# Patient Record
Sex: FEMALE | Race: WHITE | Employment: FULL TIME | URBAN - METROPOLITAN AREA
[De-identification: names, ages, dates, MRNs, and addresses within clinical notes are randomized per-mention and may not be internally consistent; named-entity substitution may affect disease eponyms.]

---

## 2017-10-03 PROBLEM — Z87.442 HISTORY OF RENAL STONE: Status: ACTIVE | Noted: 2017-10-03

## 2022-03-19 PROBLEM — Z87.442 HISTORY OF RENAL STONE: Status: ACTIVE | Noted: 2017-10-03

## 2022-11-03 ENCOUNTER — OFFICE VISIT (OUTPATIENT)
Dept: ORTHOPEDIC SURGERY | Age: 52
End: 2022-11-03
Payer: COMMERCIAL

## 2022-11-03 VITALS — BODY MASS INDEX: 21.83 KG/M2 | HEIGHT: 65 IN | WEIGHT: 131 LBS

## 2022-11-03 DIAGNOSIS — M61.411: ICD-10-CM

## 2022-11-03 DIAGNOSIS — S43.431A SUPERIOR GLENOID LABRUM LESION OF RIGHT SHOULDER, INITIAL ENCOUNTER: ICD-10-CM

## 2022-11-03 DIAGNOSIS — S43.431A LABRAL TEAR OF SHOULDER, RIGHT, INITIAL ENCOUNTER: ICD-10-CM

## 2022-11-03 DIAGNOSIS — G89.29 CHRONIC RIGHT SHOULDER PAIN: Primary | ICD-10-CM

## 2022-11-03 DIAGNOSIS — M25.511 CHRONIC RIGHT SHOULDER PAIN: Primary | ICD-10-CM

## 2022-11-03 PROCEDURE — 99213 OFFICE O/P EST LOW 20 MIN: CPT | Performed by: ORTHOPAEDIC SURGERY

## 2022-11-03 NOTE — PROGRESS NOTES
Corie Sotelo (: 1970) is a 45 y.o. female, patient, here for evaluation of the following chief complaint(s):  Shoulder Pain (Ongoing right shoulder pain )       HPI:    She began having increased right shoulder pain approximately 2 months ago. The patient reports no specific injury but does think that it could potentially be Cross[employer] related. She states that she started having some intermittent pain initially and her pain has progressed and she now describes her right shoulder pain is moderate, dull, aching, burning, and constant. Her right shoulder pain does make it difficult for her to go to sleep and does frequently wake her up from sleep. The patient states that her right shoulder pain continues to get worse. She reports that exercise and twisting makes her pain worse and being aware of and avoiding certain movements and activities makes her pain better. The patient has not been taking any medication for her discomfort. She was not seen in the emergency room for right shoulder pain. The patient reports no previous or related right shoulder surgery. No Known Allergies    Current Outpatient Medications   Medication Sig    fexofenadine (ALLEGRA) 180 mg tablet Take 1 Tab by mouth daily. fexofenadine-pseudoephedrine (ALLEGRA-D 24 HOUR) 180-240 mg per tablet Take 1 Tab by mouth daily. scopolamine (TRANSDERM-SCOP) 1 mg over 3 days pt3d 1 Patch by TransDERmal route every seventy-two (72) hours. glucosamine-chondroitin (ARTHX) 500-400 mg cap Take 1 Cap by mouth daily. cholecalciferol (VITAMIN D3) 1,000 unit cap Take  by mouth daily. triamcinolone (NASACORT) 55 mcg nasal inhaler 2 Sprays daily. norgestimate-ethinyl estradiol (ORTHO-CYCLEN) 0.25-35 mg-mcg tab Take 1 Tab by mouth daily. No current facility-administered medications for this visit.        Past Medical History:   Diagnosis Date    Calculus of kidney 2008    Date is an estimate    Fractures, stress     1 in leg, 1 in hip while running; no treatment for osteopenia    Rubio's neuroma         Past Surgical History:   Procedure Laterality Date    HX ORTHOPAEDIC      [place] BREAST SURGERY PROCEDURE UNLISTED  Late ? Breast lump biopsy (just a cyst)       Family History   Problem Relation Age of Onset    Hypertension Mother     Diabetes Father     Hypertension Father     Alcohol abuse Father     Stroke Father     Breast Cancer Maternal Aunt     Cancer Maternal Grandfather         prostate/bone? Heart Disease Maternal Grandfather     Cancer Paternal Grandfather         colon or pancreatic -  in 5 from the cancer    Cancer Maternal Aunt         breast    Heart Disease Paternal Grandmother         Social History     Socioeconomic History    Marital status:      Spouse name: Not on file    Number of children: Not on file    Years of education: Not on file    Highest education level: Not on file   Occupational History    Not on file   Tobacco Use    Smoking status: Former     Years: 10.00     Types: Cigarettes     Quit date: 1998     Years since quittin.8    Smokeless tobacco: Never   Substance and Sexual Activity    Alcohol use: Yes     Comment: I have a glass of wine every two or three months. Drug use: No    Sexual activity: Never   Other Topics Concern    Not on file   Social History Narrative    Not on file     Social Determinants of Health     Financial Resource Strain: Not on file   Food Insecurity: Not on file   Transportation Needs: Not on file   Physical Activity: Not on file   Stress: Not on file   Social Connections: Not on file   Intimate Partner Violence: Not on file   Housing Stability: Not on file       Review of Systems   All other systems reviewed and are negative. Vitals:  Ht 5' 5\" (1.651 m)   Wt 131 lb (59.4 kg)   BMI 21.80 kg/m²    Body mass index is 21.8 kg/m². Ortho Exam     Insert the patient is well-developed and well-nourished.   The patient presents today in alert and oriented x3 with a normal mood and affect. The patient stands with a normal weightbearing line and walks with a normal gait. Right shoulder, the patient sits with normal posture. They are mildly tender to palpation over the proximal biceps and posterior joint line. The patient has full range of motion, but discomfort with above shoulder range of motion. The patient has discomfort with Patino´s test and SLAP testing. There is provocative testing for a SLAP tear. The shoulder is stable on exam. They have 5/5 strength, and are neurovascularly intact distally. There is no erythema, warmth or skin lesions present. ASSESSMENT/PLAN:      1. Chronic right shoulder pain  -     XR SHOULDER RT AP/LAT MIN 2 V; Future  2. Labral tear of shoulder, right, initial encounter  -     MRI SHOULDER RT WO CONT; Future  3. Superior glenoid labrum lesion of right shoulder, initial encounter  -     MRI SHOULDER RT WO CONT; Future  4. Other calcification of muscle, right shoulder  -     MRI SHOULDER RT WO CONT; Future     XR Results (most recent):  Results from Appointment encounter on 11/03/22    XR SHOULDER RT AP/LAT MIN 2 V    Narrative  Of the right shoulder shows no evidence of fracture or dislocation. There is no evidence of degenerative disease but there is an area of calcification which is at approximately the level of the labrum/superior glenoid the does not appear to be calcific tendinitis because of its location being medial.  Unsure of the etiology of the calcification. Below is the assessment and plan developed based on review of pertinent history, physical exam, labs, studies, and medications. We discussed the patient's ongoing and worsening right shoulder pain and her signs, symptoms, physical exam, description of her pain, and x-rays are consistent with a labral tear/SLAP lesion. There is evidence on x-ray of an area of calcification which is at the level of the labrum/superior glenoid.   The calcification does not appear to be calcific tendinitis because of its location medially. The possible treatment options were discussed with the patient and because of the over 2-month long duration of her increased and worsening right shoulder pain, no improvement with multiple modalities of conservative management including an at-home exercise program, her physical exam, description of her pain, x-rays, and her inability to complete daily living activities without significant discomfort we elected to obtain an MRI of her right shoulder to further evaluate the severity of her labral tear/SLAP lesion and determine the location of the area of calcification. The MRI images and results will be used in preoperative planning if and almost certainly when surgical intervention is necessary. The risks and benefits of the MRI were discussed in detail with the patient and she would like to proceed. We will schedule at her convenience. I will see her back after MRI is complete to discuss the images, results, and further treatment options. In the interim, I did encourage her to ice when possible, modify her activity level based on her right shoulder pain, and use anti-inflammatory medication when necessary. The patient will also work on range of motion, strengthening, and stretching exercises with an at-home exercise program as pain tolerates. I will see her back as noted above after right shoulder MRI is complete. **We will obtain an MRI for more information to determine the best treatment plan moving forward and help us prepare for surgical intervention if necessary. **    Return for After her right shoulder MRI is complete. An electronic signature was used to authenticate this note.   -- Mikaela Yanez MD

## 2022-11-11 ENCOUNTER — OFFICE VISIT (OUTPATIENT)
Dept: ORTHOPEDIC SURGERY | Age: 52
End: 2022-11-11
Payer: COMMERCIAL

## 2022-11-11 VITALS — WEIGHT: 131 LBS | BODY MASS INDEX: 21.05 KG/M2 | HEIGHT: 66 IN

## 2022-11-11 DIAGNOSIS — M61.411: ICD-10-CM

## 2022-11-11 DIAGNOSIS — M89.511 OSTEOLYSIS OF ACROMIAL END OF RIGHT CLAVICLE: ICD-10-CM

## 2022-11-11 DIAGNOSIS — M25.511 CHRONIC RIGHT SHOULDER PAIN: Primary | ICD-10-CM

## 2022-11-11 DIAGNOSIS — G89.29 CHRONIC RIGHT SHOULDER PAIN: Primary | ICD-10-CM

## 2022-11-11 PROCEDURE — 99214 OFFICE O/P EST MOD 30 MIN: CPT | Performed by: ORTHOPAEDIC SURGERY

## 2022-11-11 NOTE — PROGRESS NOTES
Corie Sotelo (: 1970) is a 45 y.o. female, patient, here for evaluation of the following chief complaint(s):  Shoulder Pain (Right, mri results)       HPI:    She was last seen for right shoulder pain on 11/3/2022. Since then, the patient did have an MRI performed on her right shoulder on 2022. The patient states that her pain levels the same if not worse than it was at her last visit. The patient rates the severity of right shoulder pain is a 6 out of 10. She describes pain as aching and constant. Her right shoulder pain does make it difficult for her to go to sleep and does frequently wake her up from sleep. Patient reports taking no medication for discomfort. Right shoulder MRI images and results were independently reviewed and they were consistent with prominent reactive marrow changes at the [facility] joint either stress related or due to early degenerative changes. Small focal area of low signal in the capsule joint posterior superior to the biceps anchor. Given findings on radiograph this is favored to represent calcium hydroxyapatite deposition. Minimal edema with thin the lateral fibers of the deltoid compatible with mild strain. No Known Allergies    Current Outpatient Medications   Medication Sig    fexofenadine (ALLEGRA) 180 mg tablet Take 1 Tab by mouth daily. fexofenadine-pseudoephedrine (ALLEGRA-D 24 HOUR) 180-240 mg per tablet Take 1 Tab by mouth daily. scopolamine (TRANSDERM-SCOP) 1 mg over 3 days pt3d 1 Patch by TransDERmal route every seventy-two (72) hours. glucosamine-chondroitin (ARTHX) 500-400 mg cap Take 1 Cap by mouth daily. cholecalciferol (VITAMIN D3) 1,000 unit cap Take  by mouth daily. triamcinolone (NASACORT) 55 mcg nasal inhaler 2 Sprays daily. norgestimate-ethinyl estradiol (ORTHO-CYCLEN) 0.25-35 mg-mcg tab Take 1 Tab by mouth daily. No current facility-administered medications for this visit.        Past Medical History:   Diagnosis Date    Calculus of kidney 2008    Date is an estimate    Fractures, stress     1 in leg, 1 in hip while running; no treatment for osteopenia    Rubio's neuroma         Past Surgical History:   Procedure Laterality Date    HX ORTHOPAEDIC      [place] BREAST SURGERY PROCEDURE UNLISTED  Late ? Breast lump biopsy (just a cyst)       Family History   Problem Relation Age of Onset    Hypertension Mother     Diabetes Father     Hypertension Father     Alcohol abuse Father     Stroke Father     Breast Cancer Maternal Aunt     Cancer Maternal Grandfather         prostate/bone? Heart Disease Maternal Grandfather     Cancer Paternal Grandfather         colon or pancreatic -  in 5 from the cancer    Cancer Maternal Aunt         breast    Heart Disease Paternal Grandmother         Social History     Socioeconomic History    Marital status:      Spouse name: Not on file    Number of children: Not on file    Years of education: Not on file    Highest education level: Not on file   Occupational History    Not on file   Tobacco Use    Smoking status: Former     Years: 10.00     Types: Cigarettes     Quit date: 1998     Years since quittin.8    Smokeless tobacco: Never   Substance and Sexual Activity    Alcohol use: Yes     Comment: I have a glass of wine every two or three months. Drug use: No    Sexual activity: Never   Other Topics Concern    Not on file   Social History Narrative    Not on file     Social Determinants of Health     Financial Resource Strain: Not on file   Food Insecurity: Not on file   Transportation Needs: Not on file   Physical Activity: Not on file   Stress: Not on file   Social Connections: Not on file   Intimate Partner Violence: Not on file   Housing Stability: Not on file       Review of Systems   All other systems reviewed and are negative. Vitals:  Ht 5' 6\" (1.676 m)   Wt 131 lb (59.4 kg)   BMI 21.14 kg/m²    Body mass index is 21.14 kg/m².     Ortho Exam The patient is well-developed and well-nourished. The patient presents today in alert and oriented x3 with a normal mood and affect. The patient stands with a normal weightbearing line and walks with a normal gait. Right shoulder, the patient sits with normal posture. They are mildly tender to palpation over the proximal biceps and posterior joint line also has significant tenderness over her AC joint. With provocative testing of her [facility], [place], operated by [facility] joint she has pain with both compression and distraction maneuvers. The patient has full range of motion, but discomfort with above shoulder range of motion. The patient has discomfort with Patino´s test and SLAP testing. There is provocative testing for a SLAP tear. The shoulder is stable on exam. They have 5/5 strength, and are neurovascularly intact distally. There is no erythema, warmth or skin lesions present. ASSESSMENT/PLAN:      1. Chronic right shoulder pain  2. Other calcification of muscle, right shoulder  3. 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 We discussed the patient's ongoing and worsening right shoulder pain and we independently reviewed her MRI images and results and they were consistent with prominent reactive marrow changes at the [facility] [place], operated by [facility] joint either stress related or due to early degenerative changes. Small focal area of low signal in the capsule joint posterior superior to the biceps anchor. Given findings on radiograph this is favored to represent calcium hydroxyapatite deposition. Minimal edema with thin the lateral fibers of the deltoid compatible with mild strain. Her physical exam is consistent with osteolysis of the distal clavicle.   The possible treatment options were discussed with the patient and because of the duration of her increased and worsening pain, no improvement with multiple modalities of conservative management including an at-home exercise program, her physical exam, description of her pain, past x-rays, independently reviewed MRI images and results, and her inability to complete daily living activities without significant discomfort we both decided that surgical intervention was the best treatment plan. The risks and benefits of a right shoulder arthroscopic exam with extensive debridement and distal clavicle resection were discussed in detail with the patient and she would like to proceed. We will schedule this at her convenience. In the interim, I did encourage her to ice when possible, modify her activity level based on her right shoulder pain, and use anti-inflammatory medication when necessary. The patient will also work on range of motion, strengthening, and stretching exercises with an at-home exercise program as pain tolerates. I will see her back in the office on an as-needed basis or on the day of her upcoming right shoulder surgery. Return for In the office on an as needed basis or on the day of her upcoming right shoulder surgery. An electronic signature was used to authenticate this note.   -- Shayne Morgan MD

## 2022-11-21 DIAGNOSIS — M61.411: ICD-10-CM

## 2022-11-21 DIAGNOSIS — M89.511 OSTEOLYSIS OF ACROMIAL END OF RIGHT CLAVICLE: Primary | ICD-10-CM

## 2022-11-22 RX ORDER — OXYCODONE AND ACETAMINOPHEN 5; 325 MG/1; MG/1
1 TABLET ORAL
Qty: 40 TABLET | Refills: 0 | Status: SHIPPED | OUTPATIENT
Start: 2022-11-22 | End: 2022-11-29

## 2022-11-29 ENCOUNTER — OFFICE VISIT (OUTPATIENT)
Dept: ORTHOPEDIC SURGERY | Age: 52
End: 2022-11-29
Payer: COMMERCIAL

## 2022-11-29 DIAGNOSIS — M25.511 PAIN OF RIGHT SHOULDER REGION: Primary | ICD-10-CM

## 2022-11-29 DIAGNOSIS — Z98.890 STATUS POST SHOULDER SURGERY: ICD-10-CM

## 2022-11-29 PROCEDURE — 99024 POSTOP FOLLOW-UP VISIT: CPT | Performed by: ORTHOPAEDIC SURGERY

## 2022-11-29 NOTE — PROGRESS NOTES
Corie Sotelo (: 1970) is a 45 y.o. female, patient, here for evaluation of the following chief complaint(s):  Shoulder Pain (right) and Surgical Follow-up (Sx 22)       HPI:    She is now approaching 1 week status post right shoulder arthroscopic exam with distal clavicle resection and extensive debridement. Her surgery was performed on 2022. The patient gives no detail or description of her postoperative discomfort. She has been wearing a sling for comfort, stability, and support. She has been taking medication for pain as needed. No Known Allergies    Current Outpatient Medications   Medication Sig    oxyCODONE-acetaminophen (Percocet) 5-325 mg per tablet Take 1 Tablet by mouth every four to six (4-6) hours as needed for Pain for up to 7 days. Max Daily Amount: 6 Tablets. glucosamine-chondroitin (ARTHX) 500-400 mg cap Take 1 Cap by mouth daily. cholecalciferol (VITAMIN D3) 1,000 unit cap Take  by mouth daily. triamcinolone (NASACORT) 55 mcg nasal inhaler 2 Sprays daily. norgestimate-ethinyl estradiol (ORTHO-CYCLEN) 0.25-35 mg-mcg tab Take 1 Tab by mouth daily. No current facility-administered medications for this visit. Past Medical History:   Diagnosis Date    Calculus of kidney 2008    Date is an estimate    Fractures, stress     1 in leg, 1 in hip while running; no treatment for osteopenia    Rubio's neuroma         Past Surgical History:   Procedure Laterality Date    HX ORTHOPAEDIC      [place] BREAST SURGERY PROCEDURE UNLISTED  Late ? Breast lump biopsy (just a cyst)       Family History   Problem Relation Age of Onset    Hypertension Mother     Diabetes Father     Hypertension Father     Alcohol abuse Father     Stroke Father     Breast Cancer Maternal Aunt     Cancer Maternal Grandfather         prostate/bone?     Heart Disease Maternal Grandfather     Cancer Paternal Grandfather         colon or pancreatic -  in 5 from the cancer Cancer Maternal Aunt         breast    Heart Disease Paternal Grandmother         Social History     Socioeconomic History    Marital status:      Spouse name: Not on file    Number of children: Not on file    Years of education: Not on file    Highest education level: Not on file   Occupational History    Not on file   Tobacco Use    Smoking status: Former     Years: 10.00     Types: Cigarettes     Quit date: 1998     Years since quittin.9    Smokeless tobacco: Never   Substance and Sexual Activity    Alcohol use: Yes     Comment: I have a glass of wine every two or three months. Drug use: No    Sexual activity: Never   Other Topics Concern    Not on file   Social History Narrative    Not on file     Social Determinants of Health     Financial Resource Strain: Not on file   Food Insecurity: Not on file   Transportation Needs: Not on file   Physical Activity: Not on file   Stress: Not on file   Social Connections: Not on file   Intimate Partner Violence: Not on file   Housing Stability: Not on file       Review of Systems   All other systems reviewed and are negative. Vitals: There were no vitals taken for this visit. There is no height or weight on file to calculate BMI. Ortho Exam     The patient is well-developed and well-nourished. The patient presents today in alert and oriented x3 with a normal mood and affect. The patient stands with a normal weightbearing line and walks with a normal gait. Right shoulder wounds clean and dry neurovascular intact. There is some ecchymosis but no erythema. Her stitches were removed and her incisions are healing nicely with no sign of irritation or infection and look normal.  She does have well-maintained elbow and wrist range of motion. Her shoulder range of motion was not tested today. Her shoulder strength was not tested today. Her sensations are intact and pulses are 2+. Her skin is healing nicely. ASSESSMENT/PLAN:      1.  Pain of right shoulder region  2. Status post shoulder surgery     Below is the assessment and plan developed based on review of pertinent history, physical exam, labs, studies, and medications. We discussed the patient's recent right shoulder arthroscopic exam with distal clavicle resection and extensive debridement. Her surgery was performed on 11/23/2022. She is now approaching 1 week status postsurgical intervention. The patient is progressing nicely in the early stages of her recovery and having the appropriate amount of expected postoperative discomfort at this time. We did remove the patient stitches in the office without complication. Her incisions are healing nicely with no sign of irritation or infection and look normal.  She does have good early elbow and wrist range of motion. Her shoulder range of motion and strength were not tested today. The patient will continue the postoperative protocol by wearing a sling for assistance. We did discuss with the patient that she may discontinue use of the sling moving forward at any time. She will work on range of motion, strengthening, and stretching exercises with an at-home exercise program as pain tolerates. The patient states that her daughter is a physical therapist and she is going to help with her postoperative recovery. I did encourage her to ice when possible, modify her activity level based on her postoperative right shoulder pain, and use anti-inflammatory medication when necessary. I will see her back in 3 weeks for reevaluation and further discussion of the postoperative protocol. Return in about 3 weeks (around 12/20/2022) for Reevaluation and further discussion of the postop protocol. An electronic signature was used to authenticate this note.   -- Kwasi Denis MD

## 2022-12-27 ENCOUNTER — OFFICE VISIT (OUTPATIENT)
Dept: ORTHOPEDIC SURGERY | Age: 52
End: 2022-12-27
Payer: COMMERCIAL

## 2022-12-27 VITALS — WEIGHT: 130 LBS | BODY MASS INDEX: 20.89 KG/M2 | HEIGHT: 66 IN

## 2022-12-27 DIAGNOSIS — M25.511 PAIN OF RIGHT SHOULDER REGION: Primary | ICD-10-CM

## 2022-12-27 DIAGNOSIS — Z98.890 STATUS POST SHOULDER SURGERY: ICD-10-CM

## 2022-12-27 PROCEDURE — 99024 POSTOP FOLLOW-UP VISIT: CPT | Performed by: ORTHOPAEDIC SURGERY

## 2022-12-27 NOTE — PROGRESS NOTES
Corie Sotelo (: 1970) is a 45 y.o. female, patient, here for evaluation of the following chief complaint(s):  Shoulder Pain (right) and Surgical Follow-up (Sx 22)       HPI:    She is now approximately 5 weeks status post right shoulder arthroscopic exam with distal clavicle resection and extensive debridement. Her surgery was performed on 2022. She was last seen on 2022. The patient states that her pain level is improved since her last visit and surgical procedure. She rates the severity of her postoperative right shoulder pain is a 2 out of 10. She describes her pain as aching and intermittent. Her postoperative right shoulder pain does not wake her up from sleep at night. She has been taking Motrin for her discomfort as needed. The patient has been attending formal physical therapy postoperatively. She is also been working on these exercises with an at-home exercise program.  She reports that the medication, formal PT, and home exercises have all helped reduce her postoperative right shoulder pain. No Known Allergies    Current Outpatient Medications   Medication Sig    glucosamine-chondroitin (ARTHX) 500-400 mg cap Take 1 Cap by mouth daily. cholecalciferol (VITAMIN D3) 1,000 unit cap Take  by mouth daily. triamcinolone (NASACORT) 55 mcg nasal inhaler 2 Sprays daily. norgestimate-ethinyl estradiol (ORTHO-CYCLEN) 0.25-35 mg-mcg tab Take 1 Tab by mouth daily. No current facility-administered medications for this visit. Past Medical History:   Diagnosis Date    Calculus of kidney 2008    Date is an estimate    Fractures, stress     1 in leg, 1 in hip while running; no treatment for osteopenia    Rubio's neuroma         Past Surgical History:   Procedure Laterality Date    HX ORTHOPAEDIC      [place] BREAST SURGERY PROCEDURE UNLISTED  Late ?     Breast lump biopsy (just a cyst)       Family History   Problem Relation Age of Onset    Hypertension Mother     Diabetes Father     Hypertension Father     Alcohol abuse Father     Stroke Father     Breast Cancer Maternal Aunt     Cancer Maternal Grandfather         prostate/bone? Heart Disease Maternal Grandfather     Cancer Paternal Grandfather         colon or pancreatic -  in 5 from the cancer    Cancer Maternal Aunt         breast    Heart Disease Paternal Grandmother         Social History     Socioeconomic History    Marital status:      Spouse name: Not on file    Number of children: Not on file    Years of education: Not on file    Highest education level: Not on file   Occupational History    Not on file   Tobacco Use    Smoking status: Former     Years: 10.00     Types: Cigarettes     Quit date: 1998     Years since quittin.0    Smokeless tobacco: Never   Substance and Sexual Activity    Alcohol use: Yes     Comment: I have a glass of wine every two or three months. Drug use: No    Sexual activity: Never   Other Topics Concern    Not on file   Social History Narrative    Not on file     Social Determinants of Health     Financial Resource Strain: Not on file   Food Insecurity: Not on file   Transportation Needs: Not on file   Physical Activity: Not on file   Stress: Not on file   Social Connections: Not on file   Intimate Partner Violence: Not on file   Housing Stability: Not on file       Review of Systems   All other systems reviewed and are negative. Vitals:  Ht 5' 6\" (1.676 m)   Wt 130 lb (59 kg)   BMI 20.98 kg/m²    Body mass index is 20.98 kg/m². Ortho Exam     The patient is well-developed and well-nourished. The patient presents today in alert and oriented x3 with a normal mood and affect. The patient stands with a normal weightbearing line and walks with a normal gait. Right shoulder wounds are clean and dry neurovascular intact. There is no ecchymosis or erythema.   Her incisions are well-healed with no sign of irritation or infection and look normal. She does have full elbow and wrist range of motion. She has regained full shoulder range of motion. Her strength continues to improve. She still does have some tenderness over the site of a previous distal clavicle resection. Her sensations are intact her pulses are 2+. Her skin is well healed. ASSESSMENT/PLAN:      1. Pain of right shoulder region  2. Status post shoulder surgery  -     REFERRAL TO PHYSICAL THERAPY     Below is the assessment and plan developed based on review of pertinent history, physical exam, labs, studies, and medications. **The patient will continue attending formal physical therapy as needed. **    We discussed the patient's right shoulder arthroscopic exam with distal clavicle resection and extensive debridement. Her surgery was performed on 11/23/2022. She is now approximately 5 weeks status postsurgical intervention. The patient continues to progress very nicely in her recovery. She reports little to no postoperative discomfort. She has regained full shoulder range of motion. Her strength continues to improve. The patient will continue the postoperative protocol by working on range of motion, strengthening, and stretching exercises at both formal physical therapy as needed and with an at-home exercise program as pain tolerates. She may continue to increase activities as tolerated and as discussed today in the office. I did encourage her to ice when possible, modify her activity level if she experiences any postoperative right shoulder pain, and use anti-inflammatory medication when necessary. I will see her back in 4 weeks for reevaluation if she has any continued persistence of her pain however, if her pain has improved and is well-maintained I will see her back on an as-needed basis. Return in about 4 weeks (around 1/24/2023) for Re-evaluation and further discussion of the postop protocol. An electronic signature was used to authenticate this note.   -- Deni Mcclendon MD

## 2023-02-24 ENCOUNTER — OFFICE VISIT (OUTPATIENT)
Dept: PRIMARY CARE CLINIC | Age: 53
End: 2023-02-24
Payer: COMMERCIAL

## 2023-02-24 VITALS
SYSTOLIC BLOOD PRESSURE: 117 MMHG | OXYGEN SATURATION: 100 % | BODY MASS INDEX: 21.38 KG/M2 | HEIGHT: 66 IN | HEART RATE: 68 BPM | RESPIRATION RATE: 16 BRPM | TEMPERATURE: 97.5 F | DIASTOLIC BLOOD PRESSURE: 75 MMHG | WEIGHT: 133 LBS

## 2023-02-24 DIAGNOSIS — Z11.59 NEED FOR HEPATITIS C SCREENING TEST: ICD-10-CM

## 2023-02-24 DIAGNOSIS — Z98.890 S/P ARTHROSCOPY OF RIGHT SHOULDER: ICD-10-CM

## 2023-02-24 DIAGNOSIS — Z00.00 PHYSICAL EXAM: Primary | ICD-10-CM

## 2023-02-24 DIAGNOSIS — M85.89 OSTEOPENIA OF MULTIPLE SITES: ICD-10-CM

## 2023-02-24 DIAGNOSIS — E55.9 VITAMIN D DEFICIENCY: ICD-10-CM

## 2023-02-24 NOTE — PROGRESS NOTES
Corie Sotelo (: 1970) is a 45 y.o. female, established patient, here for evaluation of the following chief complaint(s):  No chief complaint on file. ASSESSMENT/PLAN:  Below is the assessment and plan developed based on review of pertinent history, physical exam, labs, studies, and medications. {There are no diagnoses linked to this encounter. (Refresh or delete this SmartLink)}    No follow-ups on file. SUBJECTIVE/OBJECTIVE:  HPI  Patient presents today for an office visit and a physical.            Patient Active Problem List   Diagnosis Code    History of renal stone Z87.442        Current Outpatient Medications on File Prior to Visit   Medication Sig Dispense Refill    glucosamine-chondroitin (ARTHX) 500-400 mg cap Take 1 Cap by mouth daily. cholecalciferol (VITAMIN D3) 1,000 unit cap Take  by mouth daily. triamcinolone (NASACORT) 55 mcg nasal inhaler 2 Sprays daily. norgestimate-ethinyl estradiol (ORTHO-CYCLEN) 0.25-35 mg-mcg tab Take 1 Tab by mouth daily. No current facility-administered medications on file prior to visit. No Known Allergies    Past Medical History:   Diagnosis Date    Calculus of kidney 2008    Date is an estimate    Fractures, stress     1 in leg, 1 in hip while running; no treatment for osteopenia    Rubio's neuroma        Past Surgical History:   Procedure Laterality Date    HX ORTHOPAEDIC      [place] BREAST SURGERY PROCEDURE UNLISTED  Late ? Breast lump biopsy (just a cyst)       Family History   Problem Relation Age of Onset    Hypertension Mother     Diabetes Father     Hypertension Father     Alcohol abuse Father     Stroke Father     Breast Cancer Maternal Aunt     Cancer Maternal Grandfather         prostate/bone?     Heart Disease Maternal Grandfather     Cancer Paternal Grandfather         colon or pancreatic -  in 1943 from the cancer    Cancer Maternal Aunt         breast    Heart Disease Paternal Grandmother Social History     Socioeconomic History    Marital status:      Spouse name: Not on file    Number of children: Not on file    Years of education: Not on file    Highest education level: Not on file   Occupational History    Not on file   Tobacco Use    Smoking status: Former     Years: 10.00     Types: Cigarettes     Quit date: 1998     Years since quittin.1    Smokeless tobacco: Never   Substance and Sexual Activity    Alcohol use: Yes     Comment: I have a glass of wine every two or three months. Drug use: No    Sexual activity: Never   Other Topics Concern    Not on file   Social History Narrative    Not on file     Social Determinants of Health     Financial Resource Strain: Not on file   Food Insecurity: Not on file   Transportation Needs: Not on file   Physical Activity: Not on file   Stress: Not on file   Social Connections: Not on file   Intimate Partner Violence: Not on file   Housing Stability: Not on file       No visits with results within 3 Month(s) from this visit.    Latest known visit with results is:   Office Visit on 2018   Component Date Value Ref Range Status    WBC 2018 5.8  3.4 - 10.8 x10E3/uL Final    RBC 2018 3.83  3.77 - 5.28 x10E6/uL Final    HGB 2018 12.3  11.1 - 15.9 g/dL Final    HCT 2018 37.4  34.0 - 46.6 % Final    MCV 2018 98 (A)  79 - 97 fL Final    MCH 2018 32.1  26.6 - 33.0 pg Final    MCHC 2018 32.9  31.5 - 35.7 g/dL Final    RDW 2018 12.9  12.3 - 15.4 % Final    PLATELET  045  150 - 379 x10E3/uL Final    Glucose 2018 81  65 - 99 mg/dL Final    BUN 2018 14  6 - 24 mg/dL Final    Creatinine 2018 0.85  0.57 - 1.00 mg/dL Final    GFR est non-AA 2018 80  >59 mL/min/1.73 Final    GFR est AA 2018 92  >59 mL/min/1.73 Final    BUN/Creatinine ratio 2018 16  9 - 23 Final    Sodium 2018 140  134 - 144 mmol/L Final    Potassium 2018 4.4  3.5 - 5.2 mmol/L Final    Chloride 12/14/2018 105  96 - 106 mmol/L Final    CO2 12/14/2018 25  20 - 29 mmol/L Final    Calcium 12/14/2018 9.1  8.7 - 10.2 mg/dL Final    Protein, total 12/14/2018 6.2  6.0 - 8.5 g/dL Final    Albumin 12/14/2018 4.3  3.5 - 5.5 g/dL Final    GLOBULIN, TOTAL 12/14/2018 1.9  1.5 - 4.5 g/dL Final    A-G Ratio 12/14/2018 2.3 (A)  1.2 - 2.2 Final    Bilirubin, total 12/14/2018 0.4  0.0 - 1.2 mg/dL Final    Alk. phosphatase 12/14/2018 49  39 - 117 IU/L Final    AST (SGOT) 12/14/2018 11  0 - 40 IU/L Final    ALT (SGPT) 12/14/2018 11  0 - 32 IU/L Final       Review of Systems   Constitutional:  Negative for activity change, fatigue and unexpected weight change. HENT:  Negative for congestion, hearing loss, rhinorrhea and sore throat. Eyes:  Negative for discharge. Respiratory:  Negative for cough, chest tightness and shortness of breath. Cardiovascular:  Negative for leg swelling. Gastrointestinal:  Negative for abdominal pain, constipation and diarrhea. Genitourinary:  Negative for dysuria, flank pain, frequency and urgency. Musculoskeletal:  Negative for arthralgias, back pain and myalgias. Skin:  Negative for color change and rash. Neurological:  Negative for dizziness, light-headedness and headaches. Psychiatric/Behavioral:  Negative for dysphoric mood and sleep disturbance. The patient is not nervous/anxious. There were no vitals taken for this visit. Physical Exam  Vitals and nursing note reviewed. Constitutional:       General: She is not in acute distress. Appearance: Normal appearance. She is normal weight. She is not diaphoretic. HENT:      Right Ear: Tympanic membrane, ear canal and external ear normal.      Left Ear: Tympanic membrane, ear canal and external ear normal.      Mouth/Throat:      Mouth: Mucous membranes are moist.      Pharynx: Oropharynx is clear. Eyes:      General:         Right eye: No discharge. Left eye: No discharge.       Extraocular Movements: Extraocular movements intact. Conjunctiva/sclera: Conjunctivae normal.   Neck:      Thyroid: No thyromegaly. Cardiovascular:      Rate and Rhythm: Normal rate and regular rhythm. Pulses: Normal pulses. Dorsalis pedis pulses are 2+ on the right side and 2+ on the left side. Pulmonary:      Effort: Pulmonary effort is normal.      Breath sounds: Normal breath sounds. No wheezing. Abdominal:      General: Bowel sounds are normal. There is no distension. Palpations: Abdomen is soft. Tenderness: There is no abdominal tenderness. Musculoskeletal:      Right lower leg: No edema. Left lower leg: No edema. Comments: B/L knees without crepitus   Lymphadenopathy:      Cervical: No cervical adenopathy. Neurological:      Deep Tendon Reflexes:      Reflex Scores:       Patellar reflexes are 2+ on the right side and 2+ on the left side. Psychiatric:         Mood and Affect: Mood and affect normal.         I, Juice Restrepo MD, personally performed the services described in this documentation as scribed by Luis Moreland in my presence, and it is both accurate and complete. An electronic signature was used to authenticate this note.   -- Luis Moreland

## 2023-02-24 NOTE — PROGRESS NOTES
Subjective:   45 y.o. female for Well Woman Check. Her gyne and breast care is done elsewhere by her Ob-Gyne physician. Shoulder procedure done 3 months ago. Doing well. Patient Active Problem List    Diagnosis Date Noted    S/P arthroscopy of right shoulder 2023    Osteopenia of multiple sites 2023    History of renal stone 10/03/2017     Current Outpatient Medications   Medication Sig Dispense Refill    glucosamine-chondroitin (ARTHX) 500-400 mg cap Take 1 Cap by mouth daily. cholecalciferol (VITAMIN D3) 25 mcg (1,000 unit) cap Take  by mouth daily. triamcinolone (NASACORT AQ) 55 mcg nasal inhaler 2 Sprays daily. norgestimate-ethinyl estradiol (ORTHO-CYCLEN) 0.25-35 mg-mcg tab Take 1 Tab by mouth daily. No Known Allergies  Past Medical History:   Diagnosis Date    Calculus of kidney 2008    Date is an estimate    Fractures, stress     1 in leg, 1 in hip while running; no treatment for osteopenia    Rubio's neuroma      Past Surgical History:   Procedure Laterality Date    HX ORTHOPAEDIC      [place] UNLISTED PROCEDURE BREAST  Late ? Breast lump biopsy (just a cyst)     Family History   Problem Relation Age of Onset    Hypertension Mother     Diabetes Father     Hypertension Father     Alcohol abuse Father     Stroke Father     Breast Cancer Maternal Aunt     Cancer Maternal Grandfather         prostate/bone? Heart Disease Maternal Grandfather     Cancer Paternal Grandfather         colon or pancreatic -  in 1943 from the cancer    Cancer Maternal Aunt         breast    Heart Disease Paternal Grandmother      Social History     Tobacco Use    Smoking status: Former     Years: 10.00     Types: Cigarettes     Quit date: 1998     Years since quittin.1    Smokeless tobacco: Never   Substance Use Topics    Alcohol use: Yes     Comment: I have a glass of wine every two or three months.         Lab Results   Component Value Date/Time    WBC 5.8 2018 11:23 AM    HGB 12.3 12/14/2018 11:23 AM    HCT 37.4 12/14/2018 11:23 AM    PLATELET 367 77/30/4439 11:23 AM    MCV 98 (H) 12/14/2018 11:23 AM     Lab Results   Component Value Date/Time    Glucose 81 12/14/2018 11:23 AM    LDL, calculated 89 04/12/2017 08:22 AM    Creatinine 0.85 12/14/2018 11:23 AM      Lab Results   Component Value Date/Time    Cholesterol, total 193 04/12/2017 08:22 AM    HDL Cholesterol 81 04/12/2017 08:22 AM    LDL, calculated 89 04/12/2017 08:22 AM    Triglyceride 115 04/12/2017 08:22 AM     Lab Results   Component Value Date/Time    ALT (SGPT) 11 12/14/2018 11:23 AM    Alk. phosphatase 49 12/14/2018 11:23 AM    Bilirubin, total 0.4 12/14/2018 11:23 AM    Albumin 4.3 12/14/2018 11:23 AM    Protein, total 6.2 12/14/2018 11:23 AM    PLATELET 574 19/29/6158 11:23 AM       Lab Results   Component Value Date/Time    GFR est non-AA 80 12/14/2018 11:23 AM    GFR est AA 92 12/14/2018 11:23 AM    Creatinine 0.85 12/14/2018 11:23 AM    BUN 14 12/14/2018 11:23 AM    Sodium 140 12/14/2018 11:23 AM    Potassium 4.4 12/14/2018 11:23 AM    Chloride 105 12/14/2018 11:23 AM    CO2 25 12/14/2018 11:23 AM     Lab Results   Component Value Date/Time    TSH 1.240 09/12/2016 08:17 AM         ROS: Feeling generally well. No TIA's or unusual headaches, no dysphagia. No prolonged cough. No dyspnea or chest pain on exertion. No abdominal pain, change in bowel habits, black or bloody stools. No urinary tract symptoms. No new or unusual musculoskeletal symptoms. Specific concerns today: none. Objective: The patient appears well, alert, oriented x 3, in no distress. Visit Vitals  /75 (BP 1 Location: Right upper arm, BP Patient Position: Sitting)   Pulse 68   Temp 97.5 °F (36.4 °C) (Temporal)   Resp 16   Ht 5' 6\" (1.676 m)   Wt 133 lb (60.3 kg)   SpO2 100%   BMI 21.47 kg/m²     ENT normal.  Neck supple. No adenopathy or thyromegaly. MARKELL. Lungs are clear, good air entry, no wheezes, rhonchi or rales. S1 and S2 normal, no murmurs, regular rate and rhythm. Abdomen soft without tenderness, guarding, mass or organomegaly. Extremities show no edema, normal peripheral pulses. Neurological is normal, no focal findings. Breast and Pelvic exams are deferred. Assessment/Plan:   Well Woman  continue present plan, routine labs ordered, call if any problems    ICD-10-CM ICD-9-CM    1. Physical exam  N50.83 D50.8 METABOLIC PANEL, COMPREHENSIVE      CBC W/O DIFF      LIPID PANEL      TSH 3RD GENERATION  Complete physical exam done. 2. Need for hepatitis C screening test  Z11.59 V73.89 HEPATITIS C AB      3. S/P arthroscopy of right shoulder  Z98.890 V45.89 Doing well. 4. Vitamin D deficiency  E55.9 268.9 VITAMIN D, 25 HYDROXY      5.  Osteopenia of multiple sites  M85.89 733.90 DEXA BONE DENSITY STUDY AXIAL

## 2023-02-24 NOTE — PROGRESS NOTES
1. \"Have you been to the ER, urgent care clinic since your last visit? Hospitalized since your last visit? \" No    2. \"Have you seen or consulted any other health care providers outside of the [address] [place] [address] since your last visit? \" No     3. For patients aged 39-70: Has the patient had a colonoscopy / FIT/ Cologuard? Yes - no Care Gap present      If the patient is female:    4. For patients aged 41-77: Has the patient had a mammogram within the past 2 years? Yes - no Care Gap present      5. For patients aged 21-65: Has the patient had a pap smear?  Yes - no Care Gap present [address] [facility]      Chief Complaint   Patient presents with    Physical

## 2023-03-10 ENCOUNTER — HOSPITAL ENCOUNTER (OUTPATIENT)
Dept: MAMMOGRAPHY | Age: 53
Discharge: HOME | End: 2023-03-10
Attending: INTERNAL MEDICINE
Payer: COMMERCIAL

## 2023-03-10 DIAGNOSIS — M85.89 OSTEOPENIA OF MULTIPLE SITES: ICD-10-CM

## 2023-03-10 PROCEDURE — 77080 DXA BONE DENSITY AXIAL: CPT

## 2023-03-14 NOTE — PROGRESS NOTES
Results reviewed. Release via [address], your Bone density scan showed osteopenia ( means bone density is lower than peak density at your age). I would recommend weight bearing exercises 2-3 times a week. Calcium 500 mg 4 times a week and Vitamin D3 1000 daily dose. Repeat scan in 2 years.

## 2023-05-15 ENCOUNTER — PATIENT MESSAGE (OUTPATIENT)
Dept: PRIMARY CARE CLINIC | Facility: CLINIC | Age: 53
End: 2023-05-15

## 2023-05-15 DIAGNOSIS — T78.40XA ALLERGY, INITIAL ENCOUNTER: Primary | ICD-10-CM

## 2023-05-16 RX ORDER — FEXOFENADINE HCL 180 MG/1
180 TABLET ORAL DAILY
Qty: 90 TABLET | Refills: 0 | Status: SHIPPED | OUTPATIENT
Start: 2023-05-16 | End: 2023-08-03 | Stop reason: SDUPTHER

## 2023-05-24 RX ORDER — NORGESTIMATE AND ETHINYL ESTRADIOL 0.25-0.035
1 KIT ORAL DAILY
COMMUNITY

## 2023-05-24 RX ORDER — SODIUM PHOSPHATE,MONO-DIBASIC 19G-7G/118
1 ENEMA (ML) RECTAL DAILY
COMMUNITY

## 2023-05-24 RX ORDER — TRIAMCINOLONE ACETONIDE 55 UG/1
2 SPRAY, METERED NASAL DAILY
COMMUNITY

## 2023-08-03 DIAGNOSIS — T78.40XA ALLERGY, INITIAL ENCOUNTER: ICD-10-CM

## 2023-08-03 RX ORDER — FEXOFENADINE HCL 180 MG/1
180 TABLET ORAL DAILY
Qty: 90 TABLET | Refills: 0 | Status: SHIPPED | OUTPATIENT
Start: 2023-08-03 | End: 2023-11-01

## 2023-08-28 ENCOUNTER — HOSPITAL ENCOUNTER (OUTPATIENT)
Facility: HOSPITAL | Age: 53
Discharge: HOME | End: 2023-08-31
Attending: ORTHOPAEDIC SURGERY
Payer: COMMERCIAL

## 2023-08-28 DIAGNOSIS — M25.552 LEFT HIP PAIN: ICD-10-CM

## 2023-08-28 PROCEDURE — 6360000002 HC RX W HCPCS: Performed by: RADIOLOGY

## 2023-08-28 PROCEDURE — 20610 DRAIN/INJ JOINT/BURSA W/O US: CPT

## 2023-08-28 PROCEDURE — 6360000004 HC RX CONTRAST MEDICATION: Performed by: RADIOLOGY

## 2023-08-28 RX ORDER — TRIAMCINOLONE ACETONIDE 40 MG/ML
40 INJECTION, SUSPENSION INTRA-ARTICULAR; INTRAMUSCULAR ONCE
Status: COMPLETED | OUTPATIENT
Start: 2023-08-28 | End: 2023-08-28

## 2023-08-28 RX ORDER — BUPIVACAINE HYDROCHLORIDE 5 MG/ML
30 INJECTION, SOLUTION EPIDURAL; INTRACAUDAL ONCE
Status: COMPLETED | OUTPATIENT
Start: 2023-08-28 | End: 2023-08-28

## 2023-08-28 RX ADMIN — TRIAMCINOLONE ACETONIDE 40 MG: 40 INJECTION, SUSPENSION INTRA-ARTICULAR; INTRAMUSCULAR at 12:26

## 2023-08-28 RX ADMIN — BUPIVACAINE HYDROCHLORIDE 30 ML: 5 INJECTION, SOLUTION EPIDURAL; INTRACAUDAL; PERINEURAL at 12:25

## 2023-08-28 RX ADMIN — IOHEXOL 3 ML: 180 INJECTION INTRAVENOUS at 12:26

## 2023-11-28 RX ORDER — FEXOFENADINE HCL 180 MG/1
180 TABLET ORAL DAILY
Qty: 90 TABLET | Refills: 0 | Status: SHIPPED | OUTPATIENT
Start: 2023-11-28 | End: 2024-02-26

## 2024-03-01 ENCOUNTER — OFFICE VISIT (OUTPATIENT)
Dept: PRIMARY CARE CLINIC | Facility: CLINIC | Age: 54
End: 2024-03-01
Payer: COMMERCIAL

## 2024-03-01 VITALS
TEMPERATURE: 97.5 F | DIASTOLIC BLOOD PRESSURE: 76 MMHG | SYSTOLIC BLOOD PRESSURE: 130 MMHG | BODY MASS INDEX: 23.29 KG/M2 | OXYGEN SATURATION: 98 % | RESPIRATION RATE: 17 BRPM | HEIGHT: 65 IN | WEIGHT: 139.8 LBS | HEART RATE: 78 BPM

## 2024-03-01 DIAGNOSIS — Z11.59 NEED FOR HEPATITIS B SCREENING TEST: ICD-10-CM

## 2024-03-01 DIAGNOSIS — E55.9 VITAMIN D DEFICIENCY: ICD-10-CM

## 2024-03-01 DIAGNOSIS — Z00.00 PHYSICAL EXAM: Primary | ICD-10-CM

## 2024-03-01 DIAGNOSIS — Z91.09 ENVIRONMENTAL ALLERGIES: ICD-10-CM

## 2024-03-01 DIAGNOSIS — Z11.4 ENCOUNTER FOR SCREENING FOR HIV: ICD-10-CM

## 2024-03-01 PROCEDURE — 99396 PREV VISIT EST AGE 40-64: CPT | Performed by: INTERNAL MEDICINE

## 2024-03-01 RX ORDER — CALCIUM CARBONATE/VITAMIN D3 500MG-5MCG
TABLET ORAL
COMMUNITY

## 2024-03-01 RX ORDER — CHOLECALCIFEROL (VITAMIN D3) 125 MCG
500 CAPSULE ORAL DAILY
COMMUNITY

## 2024-03-01 SDOH — ECONOMIC STABILITY: FOOD INSECURITY: WITHIN THE PAST 12 MONTHS, YOU WORRIED THAT YOUR FOOD WOULD RUN OUT BEFORE YOU GOT MONEY TO BUY MORE.: NEVER TRUE

## 2024-03-01 SDOH — ECONOMIC STABILITY: HOUSING INSECURITY
IN THE LAST 12 MONTHS, WAS THERE A TIME WHEN YOU DID NOT HAVE A STEADY PLACE TO SLEEP OR SLEPT IN A SHELTER (INCLUDING NOW)?: NO

## 2024-03-01 SDOH — ECONOMIC STABILITY: INCOME INSECURITY: HOW HARD IS IT FOR YOU TO PAY FOR THE VERY BASICS LIKE FOOD, HOUSING, MEDICAL CARE, AND HEATING?: NOT HARD AT ALL

## 2024-03-01 SDOH — ECONOMIC STABILITY: FOOD INSECURITY: WITHIN THE PAST 12 MONTHS, THE FOOD YOU BOUGHT JUST DIDN'T LAST AND YOU DIDN'T HAVE MONEY TO GET MORE.: NEVER TRUE

## 2024-03-01 ASSESSMENT — PATIENT HEALTH QUESTIONNAIRE - PHQ9
2. FEELING DOWN, DEPRESSED OR HOPELESS: 0
SUM OF ALL RESPONSES TO PHQ QUESTIONS 1-9: 0
1. LITTLE INTEREST OR PLEASURE IN DOING THINGS: 0
SUM OF ALL RESPONSES TO PHQ QUESTIONS 1-9: 0
SUM OF ALL RESPONSES TO PHQ QUESTIONS 1-9: 0
SUM OF ALL RESPONSES TO PHQ9 QUESTIONS 1 & 2: 0
SUM OF ALL RESPONSES TO PHQ QUESTIONS 1-9: 0

## 2024-03-01 ASSESSMENT — ENCOUNTER SYMPTOMS
EYE DISCHARGE: 0
SORE THROAT: 0
COUGH: 0
RHINORRHEA: 0
DIARRHEA: 0
CHEST TIGHTNESS: 0
COLOR CHANGE: 0
CONSTIPATION: 0
ABDOMINAL PAIN: 0
SHORTNESS OF BREATH: 0
BACK PAIN: 0

## 2024-03-01 NOTE — PROGRESS NOTES
Health Decision Maker has been checked with the patient      Patient has stated that the scribe can come in room    Chief Complaint   Patient presents with    Annual Exam     Getting over a cold     Depression: Not at risk (3/1/2024)    PHQ-2     PHQ-2 Score: 0      BP (!) 130/90 (Site: Left Upper Arm)   Pulse 78   Temp 97.5 °F (36.4 °C)   Resp 17   Ht 1.651 m (5' 5\")   Wt 63.4 kg (139 lb 12.8 oz)   SpO2 98%   BMI 23.26 kg/m²     \"Have you been to the ER, urgent care clinic since your last visit?  Hospitalized since your last visit?\"    NO    “Have you seen or consulted any other health care providers outside of [facility] since your last visit?”    NO        “Have you had a pap smear?”    YES - Where: Dr Henriquez Nurse/[facility] to request most recent records if not in the chart      Specialist patient sees: Gyn    Chart reviewed: immunizations are up to date and documented.   Immunization History   Administered Date(s) Administered    COVID-19, PFIZER PURPLE top, DILUTE for use, (age 12 y+), 30mcg/0.3mL 03/25/2021    Influenza Virus Vaccine 10/16/2018    Influenza, FLUARIX, FLULAVAL, FLUZONE (age 6 mo+) AND AFLURIA, (age 3 y+), PF, 0.5mL 10/03/2017, 10/15/2018, 10/06/2019, 09/15/2020    TDaP, ADACEL (age 10y-64y), BOOSTRIX (age 10y+), IM, 0.5mL 09/09/2016    Zoster Recombinant (Shingrix) 01/28/2020, 07/10/2020

## 2024-03-01 NOTE — PROGRESS NOTES
Heather Ruff (:  1970) is a 53 y.o. female, Established patient, here for evaluation of the following chief complaint(s):  Annual Exam (Getting over a cold)           ASSESSMENT/PLAN:  1. Physical exam  -     CBC; Future  -     Comprehensive Metabolic Panel; Future  -     Lipid Panel; Future  -     TSH; Future  Complete physical done today. Routine lab work ordered. Waiting for results.     2. Vitamin D deficiency  -     Vitamin D 25 Hydroxy; Future  Ordered lab work to check Vitamin D levels. Waiting for results. Recommend that patient take a Vitamin D supplement of 1,000 units daily.     3. Encounter for screening for HIV  -     HIV 1/2 Ag/Ab, 4TH Generation,W Rflx Confirm; Future  I ordered blood work to check for HIV.    4. Need for hepatitis B screening test  -     Hepatitis B Surface Antibody; Future  Ordered Hepatitis B test. Waiting for results.     5. Environmental allergies  She has an appointment with an Allergist.    USPSTF recommendations discussed with patient.            Subjective   SUBJECTIVE/OBJECTIVE:  HPI    Patient presents today for an office visit and a physical.  She is not fasting today.     She states that she has been getting over a cold recently.  She has been coughing and feels fluid in her ears.  She notes that her symptoms has jay improving.  She is taking Robitussin DM.    Pt's BP is elevated today in office at 130/90, 130/76 on manual repeat in left arm while sitting down.  She states that her BP has been elevated recently when she checks at home.  She exercises regularly 5 days per week.    She has a history of hip pain.  She has been in PT which she stats is helpful.    She takes meloxicam PRN once every few months for toe pain.    She will be seeing an Allergist due to allergies.    She takes a vitamin D supplement.  She is UTD for pap smear and mammogram.  She is UTD for colonoscopy.    Patient Active Problem List   Diagnosis    History of renal stone    S/P

## 2024-03-08 DIAGNOSIS — Z11.4 ENCOUNTER FOR SCREENING FOR HIV: ICD-10-CM

## 2024-03-08 DIAGNOSIS — Z11.59 NEED FOR HEPATITIS B SCREENING TEST: ICD-10-CM

## 2024-03-08 DIAGNOSIS — E55.9 VITAMIN D DEFICIENCY: ICD-10-CM

## 2024-03-08 DIAGNOSIS — Z00.00 PHYSICAL EXAM: ICD-10-CM

## 2024-03-08 LAB
25(OH)D3 SERPL-MCNC: 20.1 NG/ML (ref 30–100)
ALBUMIN SERPL-MCNC: 4 G/DL (ref 3.5–5)
ALBUMIN/GLOB SERPL: 1.4 (ref 1.1–2.2)
ALP SERPL-CCNC: 81 U/L (ref 45–117)
ALT SERPL-CCNC: 31 U/L (ref 12–78)
ANION GAP SERPL CALC-SCNC: 7 MMOL/L (ref 5–15)
AST SERPL-CCNC: 19 U/L (ref 15–37)
BILIRUB SERPL-MCNC: 0.6 MG/DL (ref 0.2–1)
BUN SERPL-MCNC: 17 MG/DL (ref 6–20)
BUN/CREAT SERPL: 22 (ref 12–20)
CALCIUM SERPL-MCNC: 9.3 MG/DL (ref 8.5–10.1)
CHLORIDE SERPL-SCNC: 106 MMOL/L (ref 97–108)
CHOLEST SERPL-MCNC: 238 MG/DL
CO2 SERPL-SCNC: 28 MMOL/L (ref 21–32)
CREAT SERPL-MCNC: 0.79 MG/DL (ref 0.55–1.02)
ERYTHROCYTE [DISTWIDTH] IN BLOOD BY AUTOMATED COUNT: 11.6 % (ref 11.5–14.5)
GLOBULIN SER CALC-MCNC: 2.9 G/DL (ref 2–4)
GLUCOSE SERPL-MCNC: 91 MG/DL (ref 65–100)
HBV SURFACE AB SER QL: NONREACTIVE
HBV SURFACE AB SER-ACNC: <3.1 MIU/ML
HCT VFR BLD AUTO: 39.8 % (ref 35–47)
HDLC SERPL-MCNC: 82 MG/DL
HDLC SERPL: 2.9 (ref 0–5)
HGB BLD-MCNC: 13.6 G/DL (ref 11.5–16)
HIV 1+2 AB+HIV1 P24 AG SERPL QL IA: NONREACTIVE
HIV 1/2 RESULT COMMENT: NORMAL
LDLC SERPL CALC-MCNC: 140.4 MG/DL (ref 0–100)
MCH RBC QN AUTO: 31.9 PG (ref 26–34)
MCHC RBC AUTO-ENTMCNC: 34.2 G/DL (ref 30–36.5)
MCV RBC AUTO: 93.4 FL (ref 80–99)
NRBC # BLD: 0 K/UL (ref 0–0.01)
NRBC BLD-RTO: 0 PER 100 WBC
PLATELET # BLD AUTO: 243 K/UL (ref 150–400)
PMV BLD AUTO: 9.6 FL (ref 8.9–12.9)
POTASSIUM SERPL-SCNC: 4.2 MMOL/L (ref 3.5–5.1)
PROT SERPL-MCNC: 6.9 G/DL (ref 6.4–8.2)
RBC # BLD AUTO: 4.26 M/UL (ref 3.8–5.2)
SODIUM SERPL-SCNC: 141 MMOL/L (ref 136–145)
TRIGL SERPL-MCNC: 78 MG/DL
TSH SERPL DL<=0.05 MIU/L-ACNC: 1.29 UIU/ML (ref 0.36–3.74)
VLDLC SERPL CALC-MCNC: 15.6 MG/DL
WBC # BLD AUTO: 4.3 K/UL (ref 3.6–11)

## 2024-03-11 DIAGNOSIS — Z23 NEED FOR HEPATITIS B BOOSTER VACCINATION: Primary | ICD-10-CM

## 2024-03-19 ENCOUNTER — PATIENT MESSAGE (OUTPATIENT)
Dept: PRIMARY CARE CLINIC | Facility: CLINIC | Age: 54
End: 2024-03-19

## 2024-03-19 DIAGNOSIS — T78.40XA ALLERGY, INITIAL ENCOUNTER: Primary | ICD-10-CM

## 2024-03-19 RX ORDER — FEXOFENADINE HCL 180 MG/1
TABLET ORAL
COMMUNITY
End: 2024-03-19 | Stop reason: SDUPTHER

## 2024-03-19 RX ORDER — FEXOFENADINE HCL 180 MG/1
180 TABLET ORAL DAILY
Qty: 90 TABLET | Refills: 1 | Status: SHIPPED | OUTPATIENT
Start: 2024-03-19

## 2024-03-19 NOTE — TELEPHONE ENCOUNTER
From: Heather Ruff  To: Dr. Radha Rangel  Sent: 3/19/2024 7:21 AM EDT  Subject: fexofenadine Prescription request    Hi Dr. Rangel. Can I get a refill of fexofenadine? It’s not on my medication list so I wanted to request via email. Please use the [facility] pharmacy again. Thanks!

## 2024-06-14 DIAGNOSIS — T78.40XA ALLERGY, INITIAL ENCOUNTER: ICD-10-CM

## 2024-06-17 RX ORDER — FEXOFENADINE HCL 180 MG/1
180 TABLET ORAL DAILY
Qty: 30 TABLET | Refills: 0 | Status: SHIPPED | OUTPATIENT
Start: 2024-06-17

## 2024-07-25 DIAGNOSIS — T78.40XA ALLERGY, INITIAL ENCOUNTER: ICD-10-CM

## 2024-07-25 RX ORDER — FEXOFENADINE HCL 180 MG/1
180 TABLET ORAL DAILY
Qty: 30 TABLET | Refills: 3 | Status: SHIPPED | OUTPATIENT
Start: 2024-07-25

## 2024-11-04 DIAGNOSIS — T78.40XA ALLERGY, INITIAL ENCOUNTER: ICD-10-CM

## 2024-11-05 DIAGNOSIS — J30.89 SEASONAL ALLERGIC RHINITIS DUE TO OTHER ALLERGIC TRIGGER: Primary | ICD-10-CM

## 2024-11-05 RX ORDER — AZELASTINE 1 MG/ML
1 SPRAY, METERED NASAL 2 TIMES DAILY
Qty: 60 ML | Refills: 1 | Status: SHIPPED | OUTPATIENT
Start: 2024-11-05

## 2024-11-05 RX ORDER — FEXOFENADINE HCL 180 MG/1
180 TABLET ORAL DAILY
Qty: 90 TABLET | Refills: 0 | Status: SHIPPED | OUTPATIENT
Start: 2024-11-05

## 2025-01-27 ENCOUNTER — TELEPHONE (OUTPATIENT)
Dept: PRIMARY CARE CLINIC | Facility: CLINIC | Age: 55
End: 2025-01-27

## 2025-01-27 NOTE — TELEPHONE ENCOUNTER
[facility] message sent.    Complex Repair Preamble Text (Leave Blank If You Do Not Want): Extensive wide undermining was performed.

## 2025-01-27 NOTE — TELEPHONE ENCOUNTER
----- Message from Mirella SWANSON sent at 1/24/2025  2:41 PM EST -----  Regarding: [facility] Referral Request  [facility] Referral Request    Reason for referral request: Lab/Test Order    Specialist/Lab/Test patient is requesting (if known): blood work    Specialist Phone Number (if applicable):    Additional Information PT have an appt on March 7,2025 for her Annual Physical  --------------------------------------------------------------------------------------------------------------------------    Relationship to Patient: Self     Call Back Information: OK to leave message on voicemail  Preferred Call Back Number: Phone

## 2025-01-27 NOTE — TELEPHONE ENCOUNTER
Patient is scheduled for her Physical on 3/7/25 and would like orders for blood work to be placed before the appointment

## 2025-02-12 DIAGNOSIS — T78.40XA ALLERGY, INITIAL ENCOUNTER: ICD-10-CM

## 2025-02-13 RX ORDER — FEXOFENADINE HCL 180 MG/1
180 TABLET ORAL DAILY
Qty: 90 TABLET | Refills: 0 | Status: SHIPPED | OUTPATIENT
Start: 2025-02-13

## 2025-03-24 SDOH — ECONOMIC STABILITY: INCOME INSECURITY: IN THE LAST 12 MONTHS, WAS THERE A TIME WHEN YOU WERE NOT ABLE TO PAY THE MORTGAGE OR RENT ON TIME?: NO

## 2025-03-24 SDOH — ECONOMIC STABILITY: FOOD INSECURITY: WITHIN THE PAST 12 MONTHS, YOU WORRIED THAT YOUR FOOD WOULD RUN OUT BEFORE YOU GOT MONEY TO BUY MORE.: NEVER TRUE

## 2025-03-24 SDOH — ECONOMIC STABILITY: FOOD INSECURITY: WITHIN THE PAST 12 MONTHS, THE FOOD YOU BOUGHT JUST DIDN'T LAST AND YOU DIDN'T HAVE MONEY TO GET MORE.: NEVER TRUE

## 2025-03-24 SDOH — ECONOMIC STABILITY: TRANSPORTATION INSECURITY
IN THE PAST 12 MONTHS, HAS THE LACK OF TRANSPORTATION KEPT YOU FROM MEDICAL APPOINTMENTS OR FROM GETTING MEDICATIONS?: NO

## 2025-03-24 SDOH — ECONOMIC STABILITY: TRANSPORTATION INSECURITY
IN THE PAST 12 MONTHS, HAS LACK OF TRANSPORTATION KEPT YOU FROM MEETINGS, WORK, OR FROM GETTING THINGS NEEDED FOR DAILY LIVING?: NO

## 2025-03-25 ENCOUNTER — OFFICE VISIT (OUTPATIENT)
Dept: PRIMARY CARE CLINIC | Facility: CLINIC | Age: 55
End: 2025-03-25
Payer: COMMERCIAL

## 2025-03-25 VITALS
HEIGHT: 65 IN | DIASTOLIC BLOOD PRESSURE: 71 MMHG | RESPIRATION RATE: 16 BRPM | HEART RATE: 89 BPM | TEMPERATURE: 97 F | BODY MASS INDEX: 23.16 KG/M2 | SYSTOLIC BLOOD PRESSURE: 108 MMHG | OXYGEN SATURATION: 96 % | WEIGHT: 139 LBS

## 2025-03-25 DIAGNOSIS — Z00.00 PHYSICAL EXAM: Primary | ICD-10-CM

## 2025-03-25 DIAGNOSIS — E55.9 VITAMIN D DEFICIENCY: ICD-10-CM

## 2025-03-25 DIAGNOSIS — G57.62 MORTON NEUROMA OF LEFT FOOT: ICD-10-CM

## 2025-03-25 DIAGNOSIS — Z23 NEED FOR PNEUMOCOCCAL 20-VALENT CONJUGATE VACCINATION: ICD-10-CM

## 2025-03-25 PROCEDURE — 90471 IMMUNIZATION ADMIN: CPT | Performed by: INTERNAL MEDICINE

## 2025-03-25 PROCEDURE — 99396 PREV VISIT EST AGE 40-64: CPT | Performed by: INTERNAL MEDICINE

## 2025-03-25 PROCEDURE — 90677 PCV20 VACCINE IM: CPT | Performed by: INTERNAL MEDICINE

## 2025-03-25 ASSESSMENT — ENCOUNTER SYMPTOMS
SORE THROAT: 0
EYE DISCHARGE: 0
SHORTNESS OF BREATH: 0
RHINORRHEA: 0
COLOR CHANGE: 0
COUGH: 0
ABDOMINAL PAIN: 0
CONSTIPATION: 0
DIARRHEA: 0
CHEST TIGHTNESS: 0
BACK PAIN: 0

## 2025-03-25 ASSESSMENT — PATIENT HEALTH QUESTIONNAIRE - PHQ9
2. FEELING DOWN, DEPRESSED OR HOPELESS: NOT AT ALL
1. LITTLE INTEREST OR PLEASURE IN DOING THINGS: NOT AT ALL
SUM OF ALL RESPONSES TO PHQ QUESTIONS 1-9: 0

## 2025-03-25 NOTE — PROGRESS NOTES
Heather Ruff (:  1970) is a 54 y.o. female, Established patient, here for evaluation of the following chief complaint(s):  Annual Exam      ASSESSMENT/PLAN:  1. Physical exam  -     Lipid Panel; Future  -     CBC; Future  -     Comprehensive Metabolic Panel; Future  -     TSH; Future  Complete physical done today. Routine lab work ordered. Waiting for results.    2. Vitamin D deficiency  -     Vitamin D 25 Hydroxy; Future  Ordered lab work to check Vitamin D levels. Waiting for results. Recommend that patient take a Vitamin D supplement of 1,000 units daily.    3. Encarnacion neuroma of left foot  Patient should continue taking Mobic 15 mg PRN for discomfort. She is followed by orthopedics.     4. Need for pneumococcal 20-valent conjugate vaccination  -     Pneumococcal, PCV20, PREVNAR 20, (age 6w+), IM, PF  Patient received Prevnar-2 in the office today.               Subjective   SUBJECTIVE/OBJECTIVE:  HPI    Patient presents today for an annual physical exam. She is fasting for labs.     She is taking Mobic 15 mg PRN for toe pain.      She exercises regularly. She does [employer]. She says she got hip surgery a few month ago so she hasn't been exercises as much as she used to.     Patient is UTD for colonoscopy.     She is UTD on pap smear and mammogram.     Patient Active Problem List   Diagnosis    History of renal stone    S/P arthroscopy of right shoulder    Osteopenia of multiple sites        Current Outpatient Medications on File Prior to Visit   Medication Sig Dispense Refill    azelastine (ASTELIN) 0.1 % nasal spray 1 spray by Nasal route 2 times daily Use in each nostril as directed 60 mL 1    Calcium Carb-Cholecalciferol (CALCIUM 500 + D) 500-5 MG-MCG TABS Take by mouth      vitamin B-12 (CYANOCOBALAMIN) 500 MCG tablet Take 1 tablet by mouth daily      meloxicam (MOBIC) 15 MG tablet Take 1 tablet by mouth daily 30 tablet 0    vitamin D 25 MCG (1000 UT) CAPS Take by mouth daily

## 2025-03-25 NOTE — PROGRESS NOTES
Patient provided with most updated VIS prior to administration. Opportunity given for questions and concerns provided. No concerns at this time    Immunizations administered to patient 3/25/2025 by Julia Pena LPN with consent.Patient tolerated procedure well. No reactions noted.

## 2025-03-25 NOTE — PROGRESS NOTES
\"Have you been to the ER, urgent care clinic since your last visit?  Hospitalized since your last visit?\"    NO      “Have you seen or consulted any other health care providers outside our system since your last visit?”    NO      Have you had a mammogram?”   2024  Record has jay requested via fax.     “Have you had a pap smear?”    2024  Record has jay requested via fax.    Chief Complaint   Patient presents with    Annual Exam       Pt is ok with scribe.

## 2025-04-16 DIAGNOSIS — E55.9 VITAMIN D DEFICIENCY: ICD-10-CM

## 2025-04-16 DIAGNOSIS — Z00.00 PHYSICAL EXAM: ICD-10-CM

## 2025-04-16 LAB
25(OH)D3 SERPL-MCNC: 32.7 NG/ML (ref 30–100)
ALBUMIN SERPL-MCNC: 4.1 G/DL (ref 3.5–5)
ALBUMIN/GLOB SERPL: 1.4 (ref 1.1–2.2)
ALP SERPL-CCNC: 92 U/L (ref 45–117)
ALT SERPL-CCNC: 23 U/L (ref 12–78)
ANION GAP SERPL CALC-SCNC: 2 MMOL/L (ref 2–12)
AST SERPL-CCNC: 16 U/L (ref 15–37)
BILIRUB SERPL-MCNC: 0.6 MG/DL (ref 0.2–1)
BUN SERPL-MCNC: 13 MG/DL (ref 6–20)
BUN/CREAT SERPL: 17 (ref 12–20)
CALCIUM SERPL-MCNC: 9.4 MG/DL (ref 8.5–10.1)
CHLORIDE SERPL-SCNC: 106 MMOL/L (ref 97–108)
CHOLEST SERPL-MCNC: 211 MG/DL
CO2 SERPL-SCNC: 32 MMOL/L (ref 21–32)
CREAT SERPL-MCNC: 0.75 MG/DL (ref 0.55–1.02)
ERYTHROCYTE [DISTWIDTH] IN BLOOD BY AUTOMATED COUNT: 11.5 % (ref 11.5–14.5)
GLOBULIN SER CALC-MCNC: 2.9 G/DL (ref 2–4)
GLUCOSE SERPL-MCNC: 90 MG/DL (ref 65–100)
HCT VFR BLD AUTO: 41.5 % (ref 35–47)
HDLC SERPL-MCNC: 84 MG/DL
HDLC SERPL: 2.5 (ref 0–5)
HGB BLD-MCNC: 13.7 G/DL (ref 11.5–16)
LDLC SERPL CALC-MCNC: 111.2 MG/DL (ref 0–100)
MCH RBC QN AUTO: 31.9 PG (ref 26–34)
MCHC RBC AUTO-ENTMCNC: 33 G/DL (ref 30–36.5)
MCV RBC AUTO: 96.5 FL (ref 80–99)
NRBC # BLD: 0 K/UL (ref 0–0.01)
NRBC BLD-RTO: 0 PER 100 WBC
PLATELET # BLD AUTO: 239 K/UL (ref 150–400)
PMV BLD AUTO: 10 FL (ref 8.9–12.9)
POTASSIUM SERPL-SCNC: 4.2 MMOL/L (ref 3.5–5.1)
PROT SERPL-MCNC: 7 G/DL (ref 6.4–8.2)
RBC # BLD AUTO: 4.3 M/UL (ref 3.8–5.2)
SODIUM SERPL-SCNC: 140 MMOL/L (ref 136–145)
TRIGL SERPL-MCNC: 79 MG/DL
TSH SERPL DL<=0.05 MIU/L-ACNC: 1.83 UIU/ML (ref 0.36–3.74)
VLDLC SERPL CALC-MCNC: 15.8 MG/DL
WBC # BLD AUTO: 3.7 K/UL (ref 3.6–11)

## 2025-04-20 ENCOUNTER — RESULTS FOLLOW-UP (OUTPATIENT)
Dept: PRIMARY CARE CLINIC | Facility: CLINIC | Age: 55
End: 2025-04-20